# Patient Record
Sex: FEMALE | Race: ASIAN
[De-identification: names, ages, dates, MRNs, and addresses within clinical notes are randomized per-mention and may not be internally consistent; named-entity substitution may affect disease eponyms.]

---

## 2018-05-04 ENCOUNTER — HOSPITAL ENCOUNTER (OUTPATIENT)
Dept: HOSPITAL 45 - C.RAD1850 | Age: 43
Discharge: HOME | End: 2018-05-04
Attending: FAMILY MEDICINE
Payer: COMMERCIAL

## 2018-05-04 DIAGNOSIS — M25.531: Primary | ICD-10-CM

## 2018-05-04 NOTE — DIAGNOSTIC IMAGING REPORT
R WRIST MIN 3 VIEWS ROUTINE



CLINICAL HISTORY: Right wrist pain.    



COMPARISON: None



FINDINGS:  Alignment of the right wrist is anatomic. There is no fracture or

suspicious lesion. No erosions are identified. Joint spaces are preserved.



IMPRESSION: No significant abnormality of the right wrist.







Electronically signed by:  Bismark Levin M.D.

5/4/2018 3:41 PM



Dictated Date/Time:  5/4/2018 3:40 PM